# Patient Record
Sex: FEMALE | URBAN - METROPOLITAN AREA
[De-identification: names, ages, dates, MRNs, and addresses within clinical notes are randomized per-mention and may not be internally consistent; named-entity substitution may affect disease eponyms.]

---

## 2020-11-11 ENCOUNTER — OFFICE VISIT (OUTPATIENT)
Dept: URBAN - METROPOLITAN AREA CLINIC 54 | Facility: CLINIC | Age: 70
End: 2020-11-11
Payer: MEDICARE

## 2020-11-11 DIAGNOSIS — H25.11 AGE-RELATED NUCLEAR CATARACT, RIGHT EYE: ICD-10-CM

## 2020-11-11 DIAGNOSIS — H35.412 LATTICE DEGENERATION OF RETINA, LEFT EYE: ICD-10-CM

## 2020-11-11 DIAGNOSIS — E11.3293 TYPE 2 DIABETES MELLITUS W/ MILD NONPROLIFERATIVE DIABETIC RETINOPATHY W/O MACULAR EDEMA OF BILATERAL EYES: Primary | ICD-10-CM

## 2020-11-11 PROCEDURE — 92235 FLUORESCEIN ANGRPH MLTIFRAME: CPT | Performed by: OPHTHALMOLOGY

## 2020-11-11 PROCEDURE — 92201 OPSCPY EXTND RTA DRAW UNI/BI: CPT | Performed by: OPHTHALMOLOGY

## 2020-11-11 PROCEDURE — 99244 OFF/OP CNSLTJ NEW/EST MOD 40: CPT | Performed by: OPHTHALMOLOGY

## 2020-11-11 PROCEDURE — 92134 CPTRZ OPH DX IMG PST SGM RTA: CPT | Performed by: OPHTHALMOLOGY

## 2020-11-11 PROCEDURE — 99204 OFFICE O/P NEW MOD 45 MIN: CPT | Performed by: OPHTHALMOLOGY

## 2020-11-11 ASSESSMENT — INTRAOCULAR PRESSURE
OD: 15
OS: 17

## 2020-11-11 NOTE — IMPRESSION/PLAN
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 Plan: She has few hemorrhages but no CSME or NVE OU. OCT today shows MAs and mild angiographic edema OU. We discussed the findings and natural history. I recommend observation with tight BS/BP control. She will f/u with you about her cataract. thanks [facility] RTC 6-9 months OCT OU

## 2023-05-10 ENCOUNTER — OFFICE VISIT (OUTPATIENT)
Dept: URBAN - METROPOLITAN AREA CLINIC 51 | Facility: CLINIC | Age: 73
End: 2023-05-10
Payer: MEDICARE

## 2023-05-10 DIAGNOSIS — E11.3211 TYPE 2 DIAB W MILD NONPRLF DIABETIC RTNOP W MACULAR EDEMA, RIGHT EYE: Primary | ICD-10-CM

## 2023-05-10 DIAGNOSIS — H43.813 VITREOUS DEGENERATION, BILATERAL: ICD-10-CM

## 2023-05-10 DIAGNOSIS — H25.811 COMBINED FORMS OF AGE-RELATED CATARACT, RIGHT EYE: ICD-10-CM

## 2023-05-10 DIAGNOSIS — Z79.4 LONG TERM (CURRENT) USE OF INSULIN: ICD-10-CM

## 2023-05-10 DIAGNOSIS — H26.492 OTHER SECONDARY CATARACT, LEFT EYE: ICD-10-CM

## 2023-05-10 DIAGNOSIS — E11.3292 TYPE 2 DIAB W MILD NONPRLF DIABETIC RTNOP W/O MACULAR EDEMA, LEFT EYE: ICD-10-CM

## 2023-05-10 DIAGNOSIS — H52.4 PRESBYOPIA: ICD-10-CM

## 2023-05-10 DIAGNOSIS — H50.00 ESOTROPIA: ICD-10-CM

## 2023-05-10 PROCEDURE — 99204 OFFICE O/P NEW MOD 45 MIN: CPT | Performed by: OPTOMETRIST

## 2023-05-10 PROCEDURE — 92134 CPTRZ OPH DX IMG PST SGM RTA: CPT | Performed by: OPTOMETRIST

## 2023-05-10 PROCEDURE — 92250 FUNDUS PHOTOGRAPHY W/I&R: CPT | Performed by: OPTOMETRIST

## 2023-05-10 ASSESSMENT — KERATOMETRY
OS: 43.00
OD: 44.50

## 2023-05-10 ASSESSMENT — VISUAL ACUITY
OD: 20/20
OS: 20/40

## 2023-05-10 ASSESSMENT — INTRAOCULAR PRESSURE
OD: 18
OS: 18

## 2023-05-10 NOTE — IMPRESSION/PLAN
Impression: Presbyopia: H52.4. Plan: See plan H50.00. Demonstrated and reviewed today's refraction vs. current specs with patient. Release new SRx for optimal vision. Patient to contact our office if unable to adjust to new specs.

## 2023-05-10 NOTE — IMPRESSION/PLAN
Impression: Other secondary cataract, left eye: H26.492. Plan: Opacified capsule is mild and not visually significant. Can discuss laser treatment in the future if vision worsens. Patient will monitor vision closely and contact our office with any changes/ worsening of vision. Will re-evaluate on return visit.

## 2023-05-10 NOTE — IMPRESSION/PLAN
Impression: Type 2 diab w mild nonprlf diabetic rtnop w macular edema, right eye: F40.8880. Plan: Mild Non-Proliferative Diabetic Retinopathy with mild diabetic macular edema OD. Discussed ocular and systemic benefits of blood sugar control. Continue f/u care with PCP, letter sent. Monitor with MAC OCT + FUNDUS PHOTOS.

## 2023-05-10 NOTE — IMPRESSION/PLAN
Impression: Combined forms of age-related cataract, right eye: H25.811. Plan: Educated patient on condition and findings. Cataracts are not visually significant enough to warrant surgical intervention at this time. Patient will monitor vision closely and contact our office with any changes/ worsening in vision. Monitor annually.

## 2023-05-10 NOTE — IMPRESSION/PLAN
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 Plan: Mild Non-Proliferative Diabetic Retinopathy with no diabetic macular edema OS. Discussed ocular and systemic benefits of blood sugar control. Continue f/u care with PCP, letter sent. Monitor with MAC OCT + FUNDUS PHOTOS.

## 2023-05-10 NOTE — IMPRESSION/PLAN
Impression: Esotropia: H50.00. *Patient noticing she has to look to her left in order to see. Plan: Noticing intermittent double vision. Cover test performed today in office; 25. Discussed left esotropia accounts for complaints. Recommend adding prism to specs to correct intermittent double vision, patient prefers. Patient to contact our office if unable to adjust to new specs or with worsening of double vision.

## 2024-07-03 ENCOUNTER — OFFICE VISIT (OUTPATIENT)
Dept: URBAN - METROPOLITAN AREA CLINIC 56 | Facility: LOCATION | Age: 74
End: 2024-07-03
Payer: COMMERCIAL

## 2024-07-03 DIAGNOSIS — H43.813 VITREOUS DEGENERATION, BILATERAL: ICD-10-CM

## 2024-07-03 DIAGNOSIS — H26.492 OTHER SECONDARY CATARACT, LEFT EYE: ICD-10-CM

## 2024-07-03 DIAGNOSIS — H25.11 AGE-RELATED NUCLEAR CATARACT, RIGHT EYE: ICD-10-CM

## 2024-07-03 DIAGNOSIS — E11.3393 TYPE 2 DIAB W MODERATE NONPRLF DIAB RTNOP W/O MACULAR EDEMA, BILATERAL: Primary | ICD-10-CM

## 2024-07-03 DIAGNOSIS — H52.4 PRESBYOPIA: ICD-10-CM

## 2024-07-03 PROCEDURE — 92004 COMPRE OPH EXAM NEW PT 1/>: CPT | Performed by: OPHTHALMOLOGY

## 2024-07-03 ASSESSMENT — VISUAL ACUITY
OD: 20/25
OS: 20/40

## 2024-07-03 ASSESSMENT — KERATOMETRY
OD: 44.62
OS: 42.49

## 2024-07-03 ASSESSMENT — INTRAOCULAR PRESSURE
OS: 21
OD: 21